# Patient Record
Sex: FEMALE | Race: WHITE | NOT HISPANIC OR LATINO | URBAN - METROPOLITAN AREA
[De-identification: names, ages, dates, MRNs, and addresses within clinical notes are randomized per-mention and may not be internally consistent; named-entity substitution may affect disease eponyms.]

---

## 2020-01-20 ENCOUNTER — OFFICE VISIT (OUTPATIENT)
Dept: ORTHOPEDICS | Facility: CLINIC | Age: 65
End: 2020-01-20
Payer: MEDICARE

## 2020-01-20 VITALS — BODY MASS INDEX: 25.79 KG/M2 | TEMPERATURE: 98 F | WEIGHT: 136.63 LBS | HEIGHT: 61 IN

## 2020-01-20 DIAGNOSIS — M79.642 PAIN IN LEFT HAND: Primary | ICD-10-CM

## 2020-01-20 DIAGNOSIS — M17.11 PRIMARY OSTEOARTHRITIS OF RIGHT KNEE: ICD-10-CM

## 2020-01-20 DIAGNOSIS — M18.12 ARTHRITIS OF CARPOMETACARPAL (CMC) JOINT OF LEFT THUMB: ICD-10-CM

## 2020-01-20 DIAGNOSIS — M25.561 CHRONIC PAIN OF RIGHT KNEE: ICD-10-CM

## 2020-01-20 DIAGNOSIS — G89.29 CHRONIC PAIN OF RIGHT KNEE: ICD-10-CM

## 2020-01-20 PROCEDURE — 3008F PR BODY MASS INDEX (BMI) DOCUMENTED: ICD-10-PCS | Mod: CPTII,S$GLB,, | Performed by: ORTHOPAEDIC SURGERY

## 2020-01-20 PROCEDURE — 3008F BODY MASS INDEX DOCD: CPT | Mod: CPTII,S$GLB,, | Performed by: ORTHOPAEDIC SURGERY

## 2020-01-20 PROCEDURE — 99201 PR OFFICE/OUTPT VISIT,NEW,LEVL I: ICD-10-PCS | Mod: 25,S$GLB,, | Performed by: ORTHOPAEDIC SURGERY

## 2020-01-20 PROCEDURE — 99201 PR OFFICE/OUTPT VISIT,NEW,LEVL I: CPT | Mod: 25,S$GLB,, | Performed by: ORTHOPAEDIC SURGERY

## 2020-01-20 RX ORDER — NITROGLYCERIN 0.4 MG/1
TABLET SUBLINGUAL
COMMUNITY
Start: 2020-01-16

## 2020-01-20 RX ORDER — CALCITRIOL 0.25 UG/1
CAPSULE ORAL
COMMUNITY

## 2020-01-20 RX ORDER — SUCRALFATE 1 G/1
TABLET ORAL
COMMUNITY

## 2020-01-20 RX ORDER — PROPAFENONE HYDROCHLORIDE 225 MG/1
TABLET, FILM COATED ORAL
COMMUNITY

## 2020-01-20 RX ORDER — RIVAROXABAN 20 MG/1
TABLET, FILM COATED ORAL
COMMUNITY
Start: 2019-12-15

## 2020-01-20 RX ORDER — DOXAZOSIN 4 MG/1
TABLET ORAL
COMMUNITY
Start: 2019-10-16

## 2020-01-20 RX ORDER — ISOSORBIDE DINITRATE 20 MG/1
TABLET ORAL
COMMUNITY
Start: 2019-12-09

## 2020-01-20 RX ORDER — FAMOTIDINE 40 MG/1
TABLET, FILM COATED ORAL
COMMUNITY

## 2020-01-20 RX ORDER — ALPRAZOLAM 0.25 MG/1
TABLET ORAL
COMMUNITY

## 2020-01-20 RX ORDER — ALBUTEROL SULFATE 0.83 MG/ML
3 SOLUTION RESPIRATORY (INHALATION)
COMMUNITY

## 2020-01-20 RX ORDER — ROSUVASTATIN CALCIUM 10 MG/1
TABLET, COATED ORAL
COMMUNITY

## 2020-01-20 RX ORDER — FUROSEMIDE 20 MG/1
TABLET ORAL
COMMUNITY
Start: 2019-12-08

## 2020-01-20 RX ORDER — LISINOPRIL 10 MG/1
TABLET ORAL
COMMUNITY

## 2020-01-20 RX ORDER — CARVEDILOL 6.25 MG/1
TABLET ORAL
COMMUNITY

## 2020-01-20 RX ORDER — DILTIAZEM HYDROCHLORIDE 180 MG/1
CAPSULE, COATED, EXTENDED RELEASE ORAL
COMMUNITY

## 2020-01-20 RX ORDER — BUDESONIDE AND FORMOTEROL FUMARATE DIHYDRATE 80; 4.5 UG/1; UG/1
AEROSOL RESPIRATORY (INHALATION)
COMMUNITY
Start: 2020-01-11

## 2020-01-20 RX ORDER — LEVOTHYROXINE SODIUM 100 UG/1
TABLET ORAL
COMMUNITY
Start: 2019-11-08

## 2020-01-20 NOTE — PROGRESS NOTES
Subjective:      Patient ID: Paola Shaw is a 64 y.o. female.    Chief Complaint: Pain of the Left Wrist and Pain of the Right Knee    HPI 64-year-old lady who comes in with pain at the base of her left thumb which radiates proximally into her forearm.  She also complains of right knee pain and of giving way of the right knee    Review of Systems   Constitution: Negative for fever and weight loss.   Cardiovascular: Negative for chest pain and leg swelling.   Musculoskeletal: Positive for arthritis, joint pain and stiffness. Negative for joint swelling and muscle weakness.   Gastrointestinal: Negative for change in bowel habit.   Genitourinary: Negative for bladder incontinence and hematuria.   Neurological: Negative for focal weakness, numbness, paresthesias and sensory change.         Objective:      Examination the right knee shows range motion from 0-130 degrees. She is tender at the medial femoral condyle, medial joint line, and medial tibial metaphysis.  She has no effusion. She has no pathologic laxity.  She has mild crepitus with range of motion.    Examination of the left hand shows tenderness with palpation of the 1st CMC joint. Active and passive range of motion of the left thumb is normal. She has a positive grind test      Ortho/SPM Exam            Assessment:       Encounter Diagnoses   Name Primary?    Arthritis of carpometacarpal (CMC) joint of left thumb     Primary osteoarthritis of right knee     Pain in left hand Yes    Chronic pain of right knee           Plan:       Paola was seen today for pain and pain.    Diagnoses and all orders for this visit:    Pain in left hand  -     X-Ray Hand 2 View Left; Future    Arthritis of carpometacarpal (CMC) joint of left thumb    Primary osteoarthritis of right knee    Chronic pain of right knee  -     X-Ray Knee 1 or 2 View Right; Future     X-ray of the left hand shows subluxation of the 1st metacarpal on the trapezium compatible with CMC arthritis.   x-ray of the right knee shows mild narrowing of the medial articular cartilage space.    She is offered injections which she defers on.  She is unable to take NSAIDs as she is on Eliquis.  Return p.r.n.

## 2020-07-20 ENCOUNTER — OFFICE VISIT (OUTPATIENT)
Dept: ORTHOPEDICS | Facility: CLINIC | Age: 65
End: 2020-07-20
Payer: MEDICARE

## 2020-07-20 DIAGNOSIS — M54.16 LUMBAR RADICULOPATHY, ACUTE: Primary | ICD-10-CM

## 2020-07-20 PROCEDURE — 99213 PR OFFICE/OUTPT VISIT, EST, LEVL III, 20-29 MIN: ICD-10-PCS | Mod: S$GLB,,, | Performed by: ORTHOPAEDIC SURGERY

## 2020-07-20 PROCEDURE — 99213 OFFICE O/P EST LOW 20 MIN: CPT | Mod: S$GLB,,, | Performed by: ORTHOPAEDIC SURGERY

## 2020-07-20 RX ORDER — HYDROCODONE BITARTRATE AND ACETAMINOPHEN 5; 325 MG/1; MG/1
TABLET ORAL
COMMUNITY
Start: 2020-07-13

## 2020-07-20 NOTE — PROGRESS NOTES
Subjective:      Patient ID: Paola Shaw is a 64 y.o. female.    Chief Complaint: Pain and Numbness of the Lower Back    HPI 64-year-old lady with chronic numbness in the left leg comes in with an acute episode of severe low back pain which radiates down to the level of her left knee.  She has been x-rayed in a local hospital and in her primary care physician's office.  She reports her lumbar spine x-rays were normal.  She complains of numbness in the left with leg beginning at her buttock and extending to her knee    Review of Systems   Constitution: Negative for fever and weight loss.   Cardiovascular: Negative for chest pain and leg swelling.   Musculoskeletal: Negative for arthritis, joint pain, joint swelling, muscle weakness and stiffness.   Gastrointestinal: Negative for change in bowel habit.   Genitourinary: Negative for bladder incontinence and hematuria.   Neurological: Negative for focal weakness, numbness, paresthesias and sensory change.         Objective:      Examination shows 45° of flexion at the lumbar spine she has 30° of lateral bending in 10° of extension.  She is tender with palpation of the left SI joint.  She is tender with palpation of the sciatic notch and sciatic nerve.  She has a positive straight leg raise test.  She has decreased sensation in the L5 nerve root distribution.  She has decreased Achilles tendon reflex on the left    Ortho/SPM Exam            Assessment:       Encounter Diagnosis   Name Primary?    Lumbar radiculopathy, acute Yes          Plan:       Paola was seen today for pain and numbness.    Diagnoses and all orders for this visit:    Lumbar radiculopathy, acute     She is placed on a Medrol Dosepak and given a prescription for tramadol for pain.  Arrangements were made for a course of physical therapy.  If she fails to improve she will need an MRI of the lumbar spine